# Patient Record
Sex: MALE | Race: WHITE | NOT HISPANIC OR LATINO
[De-identification: names, ages, dates, MRNs, and addresses within clinical notes are randomized per-mention and may not be internally consistent; named-entity substitution may affect disease eponyms.]

---

## 2019-08-18 ENCOUNTER — FORM ENCOUNTER (OUTPATIENT)
Age: 49
End: 2019-08-18

## 2019-08-25 ENCOUNTER — FORM ENCOUNTER (OUTPATIENT)
Age: 49
End: 2019-08-25

## 2019-10-02 ENCOUNTER — FORM ENCOUNTER (OUTPATIENT)
Age: 49
End: 2019-10-02

## 2019-10-03 ENCOUNTER — FORM ENCOUNTER (OUTPATIENT)
Age: 49
End: 2019-10-03

## 2019-11-03 ENCOUNTER — FORM ENCOUNTER (OUTPATIENT)
Age: 49
End: 2019-11-03

## 2019-11-25 ENCOUNTER — FORM ENCOUNTER (OUTPATIENT)
Age: 49
End: 2019-11-25

## 2019-12-02 ENCOUNTER — OUTPATIENT (OUTPATIENT)
Dept: OUTPATIENT SERVICES | Facility: HOSPITAL | Age: 49
LOS: 1 days | Discharge: HOME | End: 2019-12-02
Payer: MEDICARE

## 2019-12-02 VITALS
RESPIRATION RATE: 18 BRPM | WEIGHT: 196.21 LBS | SYSTOLIC BLOOD PRESSURE: 148 MMHG | DIASTOLIC BLOOD PRESSURE: 85 MMHG | TEMPERATURE: 97 F | HEIGHT: 67 IN | OXYGEN SATURATION: 97 % | HEART RATE: 80 BPM

## 2019-12-02 DIAGNOSIS — S83.231D COMPLEX TEAR OF MEDIAL MENISCUS, CURRENT INJURY, RIGHT KNEE, SUBSEQUENT ENCOUNTER: ICD-10-CM

## 2019-12-02 DIAGNOSIS — Z98.890 OTHER SPECIFIED POSTPROCEDURAL STATES: Chronic | ICD-10-CM

## 2019-12-02 DIAGNOSIS — Z01.818 ENCOUNTER FOR OTHER PREPROCEDURAL EXAMINATION: ICD-10-CM

## 2019-12-02 LAB
ALBUMIN SERPL ELPH-MCNC: 5.1 G/DL — SIGNIFICANT CHANGE UP (ref 3.5–5.2)
ALP SERPL-CCNC: 82 U/L — SIGNIFICANT CHANGE UP (ref 30–115)
ALT FLD-CCNC: 24 U/L — SIGNIFICANT CHANGE UP (ref 0–41)
ANION GAP SERPL CALC-SCNC: 15 MMOL/L — HIGH (ref 7–14)
APTT BLD: 30.8 SEC — SIGNIFICANT CHANGE UP (ref 27–39.2)
AST SERPL-CCNC: 20 U/L — SIGNIFICANT CHANGE UP (ref 0–41)
BASOPHILS # BLD AUTO: 0.03 K/UL — SIGNIFICANT CHANGE UP (ref 0–0.2)
BASOPHILS NFR BLD AUTO: 0.5 % — SIGNIFICANT CHANGE UP (ref 0–1)
BILIRUB SERPL-MCNC: 0.2 MG/DL — SIGNIFICANT CHANGE UP (ref 0.2–1.2)
BUN SERPL-MCNC: 10 MG/DL — SIGNIFICANT CHANGE UP (ref 10–20)
CALCIUM SERPL-MCNC: 10.2 MG/DL — HIGH (ref 8.5–10.1)
CHLORIDE SERPL-SCNC: 99 MMOL/L — SIGNIFICANT CHANGE UP (ref 98–110)
CO2 SERPL-SCNC: 26 MMOL/L — SIGNIFICANT CHANGE UP (ref 17–32)
CREAT SERPL-MCNC: 0.9 MG/DL — SIGNIFICANT CHANGE UP (ref 0.7–1.5)
EOSINOPHIL # BLD AUTO: 0.02 K/UL — SIGNIFICANT CHANGE UP (ref 0–0.7)
EOSINOPHIL NFR BLD AUTO: 0.3 % — SIGNIFICANT CHANGE UP (ref 0–8)
GLUCOSE SERPL-MCNC: 94 MG/DL — SIGNIFICANT CHANGE UP (ref 70–99)
HCT VFR BLD CALC: 45.7 % — SIGNIFICANT CHANGE UP (ref 42–52)
HGB BLD-MCNC: 14.7 G/DL — SIGNIFICANT CHANGE UP (ref 14–18)
IMM GRANULOCYTES NFR BLD AUTO: 0.2 % — SIGNIFICANT CHANGE UP (ref 0.1–0.3)
INR BLD: 1.02 RATIO — SIGNIFICANT CHANGE UP (ref 0.65–1.3)
LYMPHOCYTES # BLD AUTO: 1.65 K/UL — SIGNIFICANT CHANGE UP (ref 1.2–3.4)
LYMPHOCYTES # BLD AUTO: 25.4 % — SIGNIFICANT CHANGE UP (ref 20.5–51.1)
MCHC RBC-ENTMCNC: 28.3 PG — SIGNIFICANT CHANGE UP (ref 27–31)
MCHC RBC-ENTMCNC: 32.2 G/DL — SIGNIFICANT CHANGE UP (ref 32–37)
MCV RBC AUTO: 87.9 FL — SIGNIFICANT CHANGE UP (ref 80–94)
MONOCYTES # BLD AUTO: 0.6 K/UL — SIGNIFICANT CHANGE UP (ref 0.1–0.6)
MONOCYTES NFR BLD AUTO: 9.2 % — SIGNIFICANT CHANGE UP (ref 1.7–9.3)
NEUTROPHILS # BLD AUTO: 4.18 K/UL — SIGNIFICANT CHANGE UP (ref 1.4–6.5)
NEUTROPHILS NFR BLD AUTO: 64.4 % — SIGNIFICANT CHANGE UP (ref 42.2–75.2)
NRBC # BLD: 0 /100 WBCS — SIGNIFICANT CHANGE UP (ref 0–0)
PLATELET # BLD AUTO: 339 K/UL — SIGNIFICANT CHANGE UP (ref 130–400)
POTASSIUM SERPL-MCNC: 4.6 MMOL/L — SIGNIFICANT CHANGE UP (ref 3.5–5)
POTASSIUM SERPL-SCNC: 4.6 MMOL/L — SIGNIFICANT CHANGE UP (ref 3.5–5)
PROT SERPL-MCNC: 7.5 G/DL — SIGNIFICANT CHANGE UP (ref 6–8)
PROTHROM AB SERPL-ACNC: 11.7 SEC — SIGNIFICANT CHANGE UP (ref 9.95–12.87)
RBC # BLD: 5.2 M/UL — SIGNIFICANT CHANGE UP (ref 4.7–6.1)
RBC # FLD: 12.2 % — SIGNIFICANT CHANGE UP (ref 11.5–14.5)
SODIUM SERPL-SCNC: 140 MMOL/L — SIGNIFICANT CHANGE UP (ref 135–146)
WBC # BLD: 6.49 K/UL — SIGNIFICANT CHANGE UP (ref 4.8–10.8)
WBC # FLD AUTO: 6.49 K/UL — SIGNIFICANT CHANGE UP (ref 4.8–10.8)

## 2019-12-02 PROCEDURE — 93010 ELECTROCARDIOGRAM REPORT: CPT

## 2019-12-02 NOTE — H&P PST ADULT - NSICDXPASTSURGICALHX_GEN_ALL_CORE_FT
PAST SURGICAL HISTORY:  H/O coronary angiogram     H/O sinus surgery     History of hand surgery Right hand CTR

## 2019-12-02 NOTE — H&P PST ADULT - REASON FOR ADMISSION
50 yo male presents to PAST for surgical arthroscopy under GA on 12/13/2019 at [facility] OR by DR. Hair.

## 2019-12-02 NOTE — H&P PST ADULT - MUSCULOSKELETAL
No joint pain, swelling or deformity; no limitation of movement detailed exam decreased ROM due to pain/B/L knee pains

## 2019-12-02 NOTE — H&P PST ADULT - NSICDXPASTMEDICALHX_GEN_ALL_CORE_FT
PAST MEDICAL HISTORY:  GERD (gastroesophageal reflux disease)     HTN (hypertension)     HTN (hypertension)     Myocardial infarct X 2

## 2019-12-02 NOTE — H&P PST ADULT - HISTORY OF PRESENT ILLNESS
Patient presents with c/o progressively worsening bilateral knee pains x 10 yrs right>left. Patient denies any c/om cp, sob, palpitations fever, cough or dysuria. Ex tolerance of 1 fos walk with out SOB except bilateral knee pains. Positive for VINNIE. Instructions given to bring CPAP machine on the DOS. Patient presents with c/o progressively worsening bilateral knee pains x 10 yrs right>left. Patient denies any c/o cp, sob, palpitations fever, cough or dysuria. Ex tolerance of 1 fos walk with out SOB except bilateral knee pains. Positive for VINNIE. Instructions given to bring CPAP machine on the DOS.

## 2019-12-02 NOTE — H&P PST ADULT - NSANTHOSAYNRD_GEN_A_CORE
No. VINNIE screening performed.  STOP BANG Legend: 0-2 = LOW Risk; 3-4 = INTERMEDIATE Risk; 5-8 = HIGH Risk

## 2019-12-05 PROBLEM — I21.9 ACUTE MYOCARDIAL INFARCTION, UNSPECIFIED: Chronic | Status: ACTIVE | Noted: 2019-12-02

## 2019-12-05 PROBLEM — I10 ESSENTIAL (PRIMARY) HYPERTENSION: Chronic | Status: ACTIVE | Noted: 2019-12-02

## 2019-12-05 PROBLEM — K21.9 GASTRO-ESOPHAGEAL REFLUX DISEASE WITHOUT ESOPHAGITIS: Chronic | Status: ACTIVE | Noted: 2019-12-02

## 2019-12-13 ENCOUNTER — OUTPATIENT (OUTPATIENT)
Dept: OUTPATIENT SERVICES | Facility: HOSPITAL | Age: 49
LOS: 1 days | Discharge: HOME | End: 2019-12-13
Payer: MEDICARE

## 2019-12-13 ENCOUNTER — RESULT REVIEW (OUTPATIENT)
Age: 49
End: 2019-12-13

## 2019-12-13 VITALS
HEART RATE: 62 BPM | SYSTOLIC BLOOD PRESSURE: 113 MMHG | OXYGEN SATURATION: 97 % | RESPIRATION RATE: 20 BRPM | DIASTOLIC BLOOD PRESSURE: 76 MMHG

## 2019-12-13 VITALS
RESPIRATION RATE: 18 BRPM | OXYGEN SATURATION: 98 % | SYSTOLIC BLOOD PRESSURE: 115 MMHG | TEMPERATURE: 98 F | HEIGHT: 67 IN | WEIGHT: 196.21 LBS | DIASTOLIC BLOOD PRESSURE: 70 MMHG | HEART RATE: 72 BPM

## 2019-12-13 DIAGNOSIS — Z98.890 OTHER SPECIFIED POSTPROCEDURAL STATES: Chronic | ICD-10-CM

## 2019-12-13 PROCEDURE — 88304 TISSUE EXAM BY PATHOLOGIST: CPT | Mod: 26

## 2019-12-13 RX ORDER — ONDANSETRON 8 MG/1
4 TABLET, FILM COATED ORAL ONCE
Refills: 0 | Status: DISCONTINUED | OUTPATIENT
Start: 2019-12-13 | End: 2019-12-28

## 2019-12-13 RX ORDER — HYDROMORPHONE HYDROCHLORIDE 2 MG/ML
0.5 INJECTION INTRAMUSCULAR; INTRAVENOUS; SUBCUTANEOUS
Refills: 0 | Status: DISCONTINUED | OUTPATIENT
Start: 2019-12-13 | End: 2019-12-13

## 2019-12-13 RX ORDER — ASPIRIN/CALCIUM CARB/MAGNESIUM 324 MG
1 TABLET ORAL
Qty: 0 | Refills: 0 | DISCHARGE

## 2019-12-13 RX ORDER — LANSOPRAZOLE 15 MG/1
1 CAPSULE, DELAYED RELEASE ORAL
Qty: 0 | Refills: 0 | DISCHARGE

## 2019-12-13 RX ORDER — TIZANIDINE 4 MG/1
0 TABLET ORAL
Qty: 0 | Refills: 0 | DISCHARGE

## 2019-12-13 RX ORDER — SODIUM CHLORIDE 9 MG/ML
1000 INJECTION, SOLUTION INTRAVENOUS
Refills: 0 | Status: DISCONTINUED | OUTPATIENT
Start: 2019-12-13 | End: 2019-12-28

## 2019-12-13 RX ORDER — LISDEXAMFETAMINE DIMESYLATE 70 MG/1
1 CAPSULE ORAL
Qty: 0 | Refills: 0 | DISCHARGE

## 2019-12-13 RX ORDER — DILTIAZEM HCL 120 MG
1 CAPSULE, EXT RELEASE 24 HR ORAL
Qty: 0 | Refills: 0 | DISCHARGE

## 2019-12-13 RX ORDER — OXYCODONE AND ACETAMINOPHEN 5; 325 MG/1; MG/1
1 TABLET ORAL EVERY 4 HOURS
Refills: 0 | Status: DISCONTINUED | OUTPATIENT
Start: 2019-12-13 | End: 2019-12-13

## 2019-12-13 RX ORDER — EVOLOCUMAB 140 MG/ML
1 INJECTION, SOLUTION SUBCUTANEOUS
Qty: 0 | Refills: 0 | DISCHARGE

## 2019-12-13 RX ADMIN — SODIUM CHLORIDE 100 MILLILITER(S): 9 INJECTION, SOLUTION INTRAVENOUS at 13:01

## 2019-12-13 NOTE — ASU PATIENT PROFILE, ADULT - PMH
GERD (gastroesophageal reflux disease)    HTN (hypertension)    HTN (hypertension)    Myocardial infarct  X 2

## 2019-12-13 NOTE — ASU DISCHARGE PLAN (ADULT/PEDIATRIC) - CARE PROVIDER_API CALL
Filemon Hair (MD)  Orthopaedic Surgery  [address] [place] [address]  Phone: [phone]  Fax: [phone]  Follow Up Time:

## 2019-12-13 NOTE — BRIEF OPERATIVE NOTE - NSICDXBRIEFPROCEDURE_GEN_ALL_CORE_FT
PROCEDURES:  Arthroscopy of left knee with surgical removal of medial meniscus 13-Dec-2019 13:07:24  Filemon Hair

## 2019-12-17 LAB — SURGICAL PATHOLOGY STUDY: SIGNIFICANT CHANGE UP

## 2019-12-19 DIAGNOSIS — I10 ESSENTIAL (PRIMARY) HYPERTENSION: ICD-10-CM

## 2019-12-19 DIAGNOSIS — Z79.82 LONG TERM (CURRENT) USE OF ASPIRIN: ICD-10-CM

## 2019-12-19 DIAGNOSIS — M23.232 DERANGEMENT OF OTHER MEDIAL MENISCUS DUE TO OLD TEAR OR INJURY, LEFT KNEE: ICD-10-CM

## 2019-12-19 DIAGNOSIS — I21.9 ACUTE MYOCARDIAL INFARCTION, UNSPECIFIED: ICD-10-CM

## 2020-01-08 ENCOUNTER — FORM ENCOUNTER (OUTPATIENT)
Age: 50
End: 2020-01-08

## 2020-01-13 ENCOUNTER — FORM ENCOUNTER (OUTPATIENT)
Age: 50
End: 2020-01-13

## 2020-03-10 ENCOUNTER — FORM ENCOUNTER (OUTPATIENT)
Age: 50
End: 2020-03-10

## 2020-05-07 ENCOUNTER — FORM ENCOUNTER (OUTPATIENT)
Age: 50
End: 2020-05-07

## 2020-05-11 ENCOUNTER — FORM ENCOUNTER (OUTPATIENT)
Age: 50
End: 2020-05-11

## 2020-05-18 ENCOUNTER — FORM ENCOUNTER (OUTPATIENT)
Age: 50
End: 2020-05-18

## 2020-07-26 ENCOUNTER — FORM ENCOUNTER (OUTPATIENT)
Age: 50
End: 2020-07-26

## 2020-08-25 ENCOUNTER — FORM ENCOUNTER (OUTPATIENT)
Age: 50
End: 2020-08-25

## 2020-09-07 ENCOUNTER — FORM ENCOUNTER (OUTPATIENT)
Age: 50
End: 2020-09-07

## 2020-10-05 ENCOUNTER — FORM ENCOUNTER (OUTPATIENT)
Age: 50
End: 2020-10-05

## 2020-10-14 PROBLEM — Z00.00 ENCOUNTER FOR PREVENTIVE HEALTH EXAMINATION: Status: ACTIVE | Noted: 2020-10-14

## 2020-10-15 ENCOUNTER — APPOINTMENT (OUTPATIENT)
Dept: ORTHOPEDIC SURGERY | Facility: CLINIC | Age: 50
End: 2020-10-15
Payer: MEDICARE

## 2020-10-15 VITALS
WEIGHT: 193 LBS | HEIGHT: 67 IN | BODY MASS INDEX: 30.29 KG/M2 | DIASTOLIC BLOOD PRESSURE: 90 MMHG | SYSTOLIC BLOOD PRESSURE: 131 MMHG | HEART RATE: 79 BPM

## 2020-10-15 DIAGNOSIS — Z86.39 PERSONAL HISTORY OF OTHER ENDOCRINE, NUTRITIONAL AND METABOLIC DISEASE: ICD-10-CM

## 2020-10-15 DIAGNOSIS — I25.2 OLD MYOCARDIAL INFARCTION: ICD-10-CM

## 2020-10-15 DIAGNOSIS — Z72.89 OTHER PROBLEMS RELATED TO LIFESTYLE: ICD-10-CM

## 2020-10-15 DIAGNOSIS — Z86.79 PERSONAL HISTORY OF OTHER DISEASES OF THE CIRCULATORY SYSTEM: ICD-10-CM

## 2020-10-15 PROCEDURE — 99203 OFFICE O/P NEW LOW 30 MIN: CPT | Mod: 25

## 2020-10-15 PROCEDURE — 73560 X-RAY EXAM OF KNEE 1 OR 2: CPT | Mod: RT

## 2020-10-15 PROCEDURE — 20610 DRAIN/INJ JOINT/BURSA W/O US: CPT | Mod: LT

## 2020-10-15 PROCEDURE — 73564 X-RAY EXAM KNEE 4 OR MORE: CPT | Mod: LT

## 2020-10-15 RX ORDER — ASPIRIN 81 MG
81 TABLET, DELAYED RELEASE (ENTERIC COATED) ORAL
Refills: 0 | Status: ACTIVE | COMMUNITY

## 2020-10-15 NOTE — HISTORY OF PRESENT ILLNESS
[de-identified] : Mr. Duvall is a 50-year-old Retired  on disability who comes in with LEFT > RIGHT knee pain.\par he had some LEFT knee issues for several years.In December 2019 he went for a LEFT knee arthroscopy and probable partial medial meniscectomy. He will try to get me a copy of the operative report. His knee did improve after the surgery and was doing okay until about 2 months ago. At that time he was kneeling to fix the sink wearing knee pads and when he got up he had severe LEFT knee pain. He was in [place] and saw a doctor and had fluid aspirated and Cortizone injection in both knees initially. A week later he had another injection of steroids and then on September 28 went for Durolane injection. He didn't notice any improvement in his LEFT knee with ongoing pain and swelling. The RIGHT knee did get better after the hyaluronic acid injection.\par He has constant pain in his LEFT knee 8/10, sharp and stabbing at times and also feels tight. It is better with rest and ice it and taking medication. He has meloxicam and has taken some ibuprofen. recently he was feeling somewhat nauseous so wasn't sure if he should not take it.\par Pain is worse walking and bending his knee.\par He saw another doctor recently\par He did physical therapy in January of this year and he seen a chiropractor.\par He has stiffness and swelling in his knee.\par

## 2020-10-15 NOTE — ASSESSMENT
[FreeTextEntry1] : 50-year-old Retired  presents with ongoing LEFT knee pain and swelling in the last couple months about 10 months following a partial medial meniscectomy. There is some underlying osteoarthritis in the medial and patellofemoral compartments and a full-thickness chondral lesion in the weightbearing surface of the medial femoral condyle measuring about 10 x 20 mm and extensive bone marrow edema in the medial femoral condyle. There is a subtotal medial meniscectomy and he has mild varus malalignment.\par Given some of the more diffuse degenerative changes and his age being almost 51 he may likely not be a great candidate for a cartilage preservation type of procedure such as an osteochondral allograft with her probable osteotomy of the tibia to correct the varus alignment and consideration for a meniscal transplant.\par We will try a more nonoperative treatment first. I suggested getting a medial  brace and using crutches temporarily. He should do home exercises to strengthen around the knees all of which should be nonweightbearing and I reviewed with him. He should ice his knee and can take intermittent ibuprofen or meloxicam. I would see him back in 3-4 weeks to see if he's improving or not.\par Ultimately this wearing tear in the medial compartment is going to progress towards worsening osteoarthritis and eventually he may need a knee replacement.\par He should get the operative report from his prior surgery.\par

## 2020-10-15 NOTE — PROCEDURE
[Left] : of the left [Aspiration] : Aspiration [Effusion] : Effusion [Knee Joint] : knee joint [Patient] : patient [Risk] : risk [Benefits] : benefits [Bleeding] : bleeding [Alternatives] : alternatives [Infection] : infection [Allergic Reaction] : allergic reaction [Verbal Consent Obtained] : verbal consent was obtained prior to the procedure [Alcohol] : Alcohol [Betadine] : Betadine [Ethyl Chloride Spray] : ethyl chloride spray was used as a topical anesthetic [Lateral] : lateral [___ mL Fluid] : [unfilled] mL of [20] : a 20-gauge [Clear] : clear [Yellow] : yellow [Same Needle/New Syringe] : the syringe was changed and the same needle was left in place and [Bandage Applied] : a bandage [Tolerated Well] : The patient tolerated the procedure well [None] : none [No Strenuous Activity___day(s)] : avoid strenuous activity for [unfilled] day(s) [___ Week(s)] : in [unfilled] week(s)

## 2020-10-15 NOTE — PHYSICAL EXAM
[LE] : Sensory: Intact in bilateral lower extremities [DP] : dorsalis pedis 2+ and symmetric bilaterally [PT] : posterior tibial 2+ and symmetric bilaterally [Normal RLE] : Right Lower Extremity: No scars, rashes, lesions, ulcers, skin intact [Normal LLE] : Left Lower Extremity: No scars, rashes, lesions, ulcers, skin intact [Normal Touch] : sensation intact for touch [Normal] : Oriented to person, place, and time, insight and judgement were intact and the affect was normal [de-identified] : Knees:\par Antalgic gait favoring his LEFT knee.\par He cannot quite fully squat due to pain and tightness in the LEFT knee\par 2+ effusion LEFT knee and no effusion RIGHT knee.\par - erythema, edema, warmth.\par Tender LEFT knee medial joint line and medial femoral condyle and mildly patellar facets. Tender RIGHT knee mildly on the patellar facets.\par ROM: 0 degrees extension to 125-130° LEFT knee versus RIGHT knee 135 degrees flexion. Minimal crepitus\par Mild pain LEFT knee with Fay.\par 1A Lachman.  - Pivot shift. - posterior drawer. Normal rotational, varus/valgus laxity.\par Intact extensor mechanism.\par NVI distally.\par Mild Varus alignment LEFT greater than RIGHT knee\par  [de-identified] : No respiratory distress or cough [de-identified] : \par MRI of the LEFT knee in August 2016 showed an oblique tear in the medial meniscus and a grade 1 sprain of the MCL and ACL and low-grade tricompartmental chondral wear and 5 mm high-grade focus of cartilage loss in the trochlea and a small joint effusion and baker cyst.\par \par MRI October 1, 2020 LEFT knee shows Attenuation and possible re-tear of the medial meniscus and chondromalacia with a possible chondral flap medial femoral condyle with bone marrow edema in the medial femoral condyle and a large joint effusion\par \par X-rays of the LEFT knee today weightbearing were done since his last set of x-rays were nonweightbearing. On the standing AP view there is narrowing of the medial joint space by about 50 percent compared to his RIGHT knee joint space medially.No increased narrowing on the flexed view. There are degenerative changes mild to moderate at the patellofemoral joint with mild narrowing and osteophytes.

## 2020-11-04 ENCOUNTER — APPOINTMENT (OUTPATIENT)
Dept: ORTHOPEDIC SURGERY | Facility: CLINIC | Age: 50
End: 2020-11-04
Payer: MEDICARE

## 2020-11-04 DIAGNOSIS — M25.462 EFFUSION, LEFT KNEE: ICD-10-CM

## 2020-11-04 PROCEDURE — 99214 OFFICE O/P EST MOD 30 MIN: CPT

## 2020-11-04 NOTE — HISTORY OF PRESENT ILLNESS
[de-identified] : Mr. Duvall comes in for f/u LEFT knee pain.\par He used crutches and knee brace. He has some young boys at home and it didn't use the brace all the time and was on it a lot during the day. It's difficult for him to be an active although he thinks he'll be able to do a better job now.\par He still has pain and swelling in the knee which doesn't feel much better than last visit.\par He has taken no meloxicam.\par \par Pain is worse walking and bending his knee.\par \par He did physical therapy in January of this year and he seen a chiropractor.\par He has stiffness and swelling in his knee.\par

## 2020-11-04 NOTE — PHYSICAL EXAM
[Slightly Antalgic] : slightly antalgic [de-identified] : Knees:\par Antalgic gait favoring his LEFT knee.\par 2+ effusion LEFT knee and no effusion RIGHT knee.\par - erythema, edema, warmth.\par Tender LEFT knee anterior medial joint line and medial femoral condyle and mildly patellar facets. Tender RIGHT knee mildly on the patellar facets.\par ROM: 0 degrees extension to 125-130° LEFT knee versus RIGHT knee 135 degrees flexion. Minimal crepitus\par Mild pain LEFT knee with Fay.\par 1A Lachman.  - Pivot shift. - posterior drawer. Normal rotational, varus/valgus laxity.\par Intact extensor mechanism.\par NVI distally.\par Mild Varus alignment LEFT greater than RIGHT knee\par  [de-identified] : No respiratory distress or cough [de-identified] : \par MRI of the LEFT knee in August 2016 showed an oblique tear in the medial meniscus and a grade 1 sprain of the MCL and ACL and low-grade tricompartmental chondral wear and 5 mm high-grade focus of cartilage loss in the trochlea and a small joint effusion and baker cyst.\par \par MRI October 1, 2020 LEFT knee shows Attenuation and possible re-tear of the medial meniscus and chondromalacia with a possible chondral flap medial femoral condyle with bone marrow edema in the medial femoral condyle and a large joint effusion. there is an area of complete chondral loss medial femoral condyle that name measure about 1 x 2 cm \par \par X-rays of the LEFT knee last visit weightbearing  standing AP view there is narrowing of the medial joint space by about 50 percent compared to his RIGHT knee joint space medially.No increased narrowing on the flexed view. There are degenerative changes mild to moderate at the patellofemoral joint with mild narrowing and osteophytes.

## 2020-11-04 NOTE — ASSESSMENT
[FreeTextEntry1] : 50-year-old retired  presents with ongoing LEFT knee pain and swelling in the last several months about 11 months following a partial medial meniscectomy. There is some underlying osteoarthritis in the medial and patellofemoral compartments and a full-thickness chondral lesion in the weightbearing surface of the medial femoral condyle measuring about 10 x 20 mm and extensive bone marrow edema in the medial femoral condyle. There is a subtotal medial meniscectomy and he has mild varus malalignment.\par Unfortunately using the crutches and braces have not helped although I don't think he is done it really consistently. Think he was wearing the brace a little distal end we worked on pulling it more proximally to center it on the knee joint which hopefully may help. With the brace on and crutches walking light partial weightbearing he had no pain. He'll give it another 3 weeks of protected weightbearing and NWB exercises to see if it improves.  Ice and NSAIDs as needed.\par he has tried hyaluronic acid injections. He could consider PRP or stem cell injections although It is unlikely to really restore any cartilage but may provide some pain relief and decrease inflammation.\par I discussed treatment options. To try to restore the cartilage one may consider tibial osteotomy, a meniscal transplant and osteochondral allograft. Otherwise one may consider a partial unicompartmental knee replacement. If he got good enough pain relief he could wait and do a partial or total knee replacement at some point in the future when needed.\par He is going to see one of my colleagues in 3 weeks to get his opinion.\par He can followup as needed after that.

## 2020-11-19 ENCOUNTER — APPOINTMENT (OUTPATIENT)
Dept: ORTHOPEDIC SURGERY | Facility: CLINIC | Age: 50
End: 2020-11-19
Payer: MEDICARE

## 2020-11-19 VITALS
SYSTOLIC BLOOD PRESSURE: 134 MMHG | HEART RATE: 82 BPM | HEIGHT: 67 IN | WEIGHT: 193 LBS | DIASTOLIC BLOOD PRESSURE: 88 MMHG | BODY MASS INDEX: 30.29 KG/M2 | OXYGEN SATURATION: 98 %

## 2020-11-19 DIAGNOSIS — M25.561 PAIN IN RIGHT KNEE: ICD-10-CM

## 2020-11-19 DIAGNOSIS — G89.29 PAIN IN RIGHT KNEE: ICD-10-CM

## 2020-11-19 DIAGNOSIS — M95.8 OTHER SPECIFIED ACQUIRED DEFORMITIES OF MUSCULOSKELETAL SYSTEM: ICD-10-CM

## 2020-11-19 PROCEDURE — 99214 OFFICE O/P EST MOD 30 MIN: CPT

## 2020-11-19 NOTE — HISTORY OF PRESENT ILLNESS
[Bending] : worsened by bending [Walking] : worsened by walking [Ice] : relieved by ice [Rest] : relieved by rest [de-identified] : ELIZABETH OROSCO is a 50 year  old  patient that presents today with Right knee pain sent for consultation by Dr. Miller. He has  along history of left knee pain. Parents have had TKAs but in their mid to late 70's. \par He had meniscectomy one year ago and since has had recurrent pain and swelling. \par Dr. Miller treated him with combination of Arthrocentesis HA injection and medial unloading brace which has helped significantly. He still gets occasional swelling and medial knee pain and comes to discuss treatment options. \par \par

## 2020-11-19 NOTE — DISCUSSION/SUMMARY
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 Unfortunately x-rays were down today so could not get a scanogram buit have sent him for one at [facility]. \par He feels a mechanical catch which is related to the hanging chondral flap at this time however his pain has been significantly mitigated with  brace and HA injection. \par He and I spent a long time discussing non operative and operative options for this knee in a 50 year old patient. \par For now we will let him consider options while we see if the knee pain continues to improve with the unloading brace and added pHysical therapy for Quad core and gluteal PRE plus cyclic loading. While doing this we will get full length scanogram. \par Future options would include a staging arthroscopy to evaluate meniscus and AC and harvest cells for future ZAC transplant if appropriate. The scanogram would let us know if osteotomy would be needed as well. LAstly the chondral flap debridement would hopefully get rid of the mechanical catch he is getting. \par For ultimate treatment at this age the choices would be Biologic reconstruction with cell transplant or OsteoChondral donor allograft transplant along with osteotomy if alignment is varus more than 5 degrees.\par The other option would be a unicondylar knee arthroplasty of the medial side which actually would offer quicker recovery if at 50 he is willing to accept metal and plastic. \par If arthroscopically we see that  the chondral defect is only partial then in early 2021 once approved by FDA we could consider the NU Surface meniscal implant as a quicker recovery option. \par \par All of these options with their attendant post op course were discussed and we will reevaluate him in one month after PT completed to see his level of pain and discuss again for a final decision.

## 2020-11-19 NOTE — REVIEW OF SYSTEMS
[Joint Pain] : joint pain [Joint Stiffness] : joint stiffness [Joint Swelling] : joint swelling [Headache] : headache [Negative] : Heme/Lymph

## 2020-11-19 NOTE — PHYSICAL EXAM
[de-identified] : Patient is well nourished, well-developed, in no acute distress, with appropriate mood and affect. The patient is oriented to time, place, and person. Gait evaluation notes ambulation with cane.  The skin examination does not reveal obvious lesions, lacerations, or ecchymosis.\par Knee exam: ROM 0-135 degrees: Negative Lachman and Fay Positive medial joint tenderness and lateral patella tenderness. \par Grade 1/2 effusion today. \par \par  [de-identified] : Previous x-rays show patella malalignment and minimal medial joint narrowing KL2 only but MRI done in October shows chondral defect MFC with partial posterior horn menisectomy /lateral joint structures normal.

## 2020-11-23 ENCOUNTER — OUTPATIENT (OUTPATIENT)
Dept: OUTPATIENT SERVICES | Facility: HOSPITAL | Age: 50
LOS: 1 days | Discharge: HOME | End: 2020-11-23
Payer: MEDICARE

## 2020-11-23 DIAGNOSIS — M79.606 PAIN IN LEG, UNSPECIFIED: ICD-10-CM

## 2020-11-23 DIAGNOSIS — Z98.890 OTHER SPECIFIED POSTPROCEDURAL STATES: Chronic | ICD-10-CM

## 2020-11-23 PROCEDURE — 77073 BONE LENGTH STUDIES: CPT | Mod: 26

## 2020-12-09 ENCOUNTER — APPOINTMENT (OUTPATIENT)
Dept: ORTHOPEDIC SURGERY | Facility: CLINIC | Age: 50
End: 2020-12-09
Payer: MEDICARE

## 2020-12-09 ENCOUNTER — APPOINTMENT (OUTPATIENT)
Dept: ORTHOPEDIC SURGERY | Facility: CLINIC | Age: 50
End: 2020-12-09

## 2020-12-09 VITALS
SYSTOLIC BLOOD PRESSURE: 124 MMHG | BODY MASS INDEX: 30.61 KG/M2 | HEIGHT: 67 IN | HEART RATE: 59 BPM | DIASTOLIC BLOOD PRESSURE: 83 MMHG | WEIGHT: 195 LBS

## 2020-12-09 DIAGNOSIS — M25.562 PAIN IN LEFT KNEE: ICD-10-CM

## 2020-12-09 DIAGNOSIS — M17.5 OTHER UNILATERAL SECONDARY OSTEOARTHRITIS OF KNEE: ICD-10-CM

## 2020-12-09 DIAGNOSIS — M17.12 UNILATERAL PRIMARY OSTEOARTHRITIS, LEFT KNEE: ICD-10-CM

## 2020-12-09 DIAGNOSIS — Z82.61 FAMILY HISTORY OF ARTHRITIS: ICD-10-CM

## 2020-12-09 DIAGNOSIS — Z87.39 PERSONAL HISTORY OF OTHER DISEASES OF THE MUSCULOSKELETAL SYSTEM AND CONNECTIVE TISSUE: ICD-10-CM

## 2020-12-09 PROCEDURE — 99214 OFFICE O/P EST MOD 30 MIN: CPT

## 2020-12-09 NOTE — PHYSICAL EXAM
[de-identified] : General appearance: well nourished and hydrated, pleasant, alert and oriented x 3, cooperative.  \par HEENT: normocephalic, EOM intact, wearing mask, external auditory canal clear.  \par Cardiovascular: no lower leg edema, no varicosities, dorsalis pedis pulses palpable and symmetric.  \par Lymphatics: no palpable lymphadenopathy, no lymphedema.  \par Neurologic: sensation is normal, no muscle weakness in upper or lower extremities, patella tendon reflexes present and symmetric.  \par Dermatologic: skin moist, warm, no rash.  \par Spine: cervical spine with normal lordosis and painless range of motion, thoracic spine with normal kyphosis and painless range of motion, lumbosacral spine with normal lordosis and stiff range of motion.  Tenderness to palpation along midline lumbar spine and paraspinal musculature.  Sacroiliac joints tender bilaterally, more so on the left. Negative SLR and crossed SLR tests bilaterally.\par Gait: mild left antalgia with reduced stride length and sukhjinder; no varus thrust.\par \par Left knee:\par - Inspection: moderate effusion, negative ecchymosis and erythema.  \par - Wounds: healed arthroscopic portals.\par - Alignment: normal.  \par - Palpation: medial and lateral joint line, peripatellar tenderness on palpation.  \par - ROM active: 0-140, pain on extremes of motion\par - Ligamentous laxity: negative Lachman, negative ant. drawer test, negative post. drawer test, negative pivot shift test, stable to varus stress, stable to valgus stress.  \par - Meniscal Test: painful Fay and Carlie.  \par - Popliteal angle: 60 degrees\par - Muscle Test: 5/5 quad strength.  \par \par Right knee:\par - Inspection: negative swelling, ecchymosis, and erythema.  \par - Wounds: none.  \par - Alignment: normal.  \par - Palpation: medial and lateral joint line, peripatellar tenderness on palpation.  \par - ROM active: 0-140, pain on extremes of motion\par - Ligamentous laxity: negative Lachman, negative ant. drawer test, negative post. drawer test, negative pivot shift test, stable to varus stress, stable to valgus stress.  \par - Meniscal Test: painful Fay and Carlie.  \par - Popliteal angle: 60 degrees\par - Muscle Test: 5/5 quad strength.   [de-identified] : XRs were reviewed from [facility] and MRI was reviewed from disc from [facility]; those images were not able to be uploaded to any of our platforms.\par \par XRs demonstrate moderate left knee medial compartment osteoarthritis with approximately 50% joint space narrowing, Kellgren-Blaze 2. Lateral and patellofemoral compartments appear well preserved. Resultant mild varus malalignment on long leg standing films, ~9 degrees total limb varus. No translational deformity. Patella sits at appropriate height and tracks centrally.\par \par MRI demonstrates multiple focal full thickness chondral defects in the medial femoral condyle with diffuse bony edema medially. Evidence of prior medial meniscectomy with new radial tear at the posterior horn. Lateral compartment without significant chondral disease or lateral meniscal pathology. Lateral trochlea with chondrosis; patellar cartilage in good condition.

## 2020-12-09 NOTE — HISTORY OF PRESENT ILLNESS
[de-identified] : Here for follow up after scanogram and MRI evaluation to discuss surgical options.

## 2020-12-09 NOTE — HISTORY OF PRESENT ILLNESS
[de-identified] : 51y/o male referred by Dr. Baker for evaluation of left knee osteoarthritis. Patient has a history of chronic pain affecting the bilateral lower extremities and lumbar spine for approximately 15-20 years, most severely affecting the left knee. He attributes this to strenuous sporting activities in his youth and a physical job at the [employer]. He is currently retired, both for cardiac issues and chronic MSK pain. He has been through exhaustive treatments for the left knee including multiple courses of PT, CSI and HA injections, oral analgesics, and arthroscopic surgery last December. He currently uses a cane and a medial offloader brace with some relief. With these, he is able to ambulate approximately ten blocks before the knee stops him. Pain is mostly at the medial aspect of the knee, but he also reports lateral joint line pain and sharp anterior pain with proximal and distal radiation. He has similar symptoms on the right, though less severe. He has significant midline low back pain and active left sided sciatica that he mostly feels in the posterolateral left thigh. He has 9 and 14y/o children and feels regretful that he can't play football with them. He is planning to move to [place] within a month or two. He is here to discuss partial vs. total knee arthroplasty.\par \par He has a history of Prinzmetal angina with 2x MI in 2013 and 2016. He has not undergone any cardiac stenting or open heart surgery, though he reports that his cardiologist has suggested that he may require an open heart procedure at some point in his 50s. [Stable] : stable [8] : a current pain level of 8/10 [Daily] : ~He/She~ states the symptoms seem to be occuring daily [Bending] : worsened by bending [Walking] : worsened by walking [Heat] : relieved by heat [Ice] : relieved by ice [NSAIDs] : relieved by nonsteroidal anti-inflammatory drugs [de-identified] : full/sharp/stabbing/swollen

## 2020-12-09 NOTE — REVIEW OF SYSTEMS
[Arthralgia] : arthralgia [Joint Pain] : joint pain [Joint Stiffness] : joint stiffness [Joint Swelling] : joint swelling [Negative] : Heme/Lymph [FreeTextEntry5] : Hx of printzmetal angina

## 2020-12-09 NOTE — DISCUSSION/SUMMARY
[de-identified] : 49y/o male with left knee medial femoral condylar osteochondral injuries and subchondral osseous stress reaction, medial meniscal tear, moderate medial and patellofemoral osteoarthritis\par - We discussed the details of medial unicondylar as well as total knee arthroplasty. Given his significant pain in all three compartments, I recommended against unicondylar arthroplasty and focused the remainder of the conversation on total knee arthroplasty. We discussed the expected recovery period, and the expected outcome. We discussed the likelihood of satisfaction after complete recovery, and the potential causes of dissatisfaction. The importance of active patient participation in the rehabilitation protocol was emphasized, along with its influence on short and long-term outcomes. Specific risks of total knee replacement were discussed in detail. We discussed the risk of surgical site complications including but not limited to: surgical site infection, wound healing complications, bone fracture, tendon or ligament injury, neurovascular injury, hemorrhage, postoperative stiffness or instability, persistent pain and need for reoperation or manipulation under anesthesia. We discussed surgical blood loss and the possible need for blood transfusion. We discussed the risk of perioperative medical complications, including but not limited to catheter-associated urinary tract infection, venous thromboembolism and other cardiopulmonary complications. We discussed anesthetic options and the risk of anesthesia-related complications. We discussed the relevance of his young age and the increased risk of chronic pain and limited function in the younger arthroplasty patient. We discussed implant fixation methods; my plan would be to use cementless fixation if possible in this case. We discussed the variable need to resurface the patella; my plan would be to resurface in this case. We discussed the durability of prosthetic knees and limitations related to wear, osteolysis and loosening. \par - I ultimately recommended that he not proceed with TKA scheduling given: the relatively mild-moderate radiographic severity of disease; his well preserved range of motion; and his young age. He will continue following up with Dr. Baker to discuss joint preservation procedures. In the meantime, he will continue with therapeutic exercise and Tylenol/NSAIDs/injections as needed for symptomatic control.\par - Follow up with me as needed.

## 2020-12-09 NOTE — DISCUSSION/SUMMARY
[de-identified] : Had long discussion today about two options after noting 7.5 degrees varus of left knee with 1 degree of right. \par If he chose Biologic route for reconstruction he would need HTO plus meniscal and probable ZAC implnats. \par As he is nearing 51 and retired on disability I believe the better option is one surgery : Unicondylar Knee replacement\par His other option to delay this would be an Orthobiologic injection and we have discussed options of Amniotic injection vs Prp to buy time before he moves to [place] and continue  brace and then consider Uni in a year. \par He will see Dr Ian Amaral today and consider Uni and then return if not a candidate. \par \par Addendum: Dr Amaral fully evaluated and believes he only has KL2 DJD and would need not uni but total due to PF discomfort therefore he does not believe he is ready for an arthroplasty. \par Choice would be TWYLA injection in office versus arthroscopic debridement with  retrograde drilling of MFC sub chondral edema with BMAC or prp injected which would necessitate 6 weeks on crutches and staying in [place] at least 6-8 weeks PO before moving to [place]. As he wants to move sooner we will arrange for TWYLA injection and Physical Therapy now and allow him to move first of year with further f/u in [place]. \par \par \par \par

## 2020-12-16 ENCOUNTER — APPOINTMENT (OUTPATIENT)
Dept: ORTHOPEDIC SURGERY | Facility: CLINIC | Age: 50
End: 2020-12-16

## 2020-12-23 ENCOUNTER — APPOINTMENT (OUTPATIENT)
Dept: ORTHOPEDIC SURGERY | Facility: CLINIC | Age: 50
End: 2020-12-23

## 2021-01-12 ENCOUNTER — FORM ENCOUNTER (OUTPATIENT)
Age: 51
End: 2021-01-12

## 2021-04-18 ENCOUNTER — FORM ENCOUNTER (OUTPATIENT)
Age: 51
End: 2021-04-18

## 2021-06-30 ENCOUNTER — FORM ENCOUNTER (OUTPATIENT)
Age: 51
End: 2021-06-30

## 2021-08-12 ENCOUNTER — FORM ENCOUNTER (OUTPATIENT)
Age: 51
End: 2021-08-12

## 2021-10-03 ENCOUNTER — FORM ENCOUNTER (OUTPATIENT)
Age: 51
End: 2021-10-03

## 2021-10-24 ENCOUNTER — FORM ENCOUNTER (OUTPATIENT)
Age: 51
End: 2021-10-24

## 2021-11-07 ENCOUNTER — FORM ENCOUNTER (OUTPATIENT)
Age: 51
End: 2021-11-07

## 2022-03-03 ENCOUNTER — FORM ENCOUNTER (OUTPATIENT)
Age: 52
End: 2022-03-03

## 2022-04-03 ENCOUNTER — FORM ENCOUNTER (OUTPATIENT)
Age: 52
End: 2022-04-03

## 2022-06-19 ENCOUNTER — FORM ENCOUNTER (OUTPATIENT)
Age: 52
End: 2022-06-19

## 2022-06-21 ENCOUNTER — APPOINTMENT (OUTPATIENT)
Dept: ORTHOPEDIC SURGERY | Facility: CLINIC | Age: 52
End: 2022-06-21

## 2022-06-21 VITALS — WEIGHT: 195 LBS | BODY MASS INDEX: 30.61 KG/M2 | HEIGHT: 67 IN

## 2022-06-21 PROCEDURE — 99213 OFFICE O/P EST LOW 20 MIN: CPT | Mod: 25

## 2022-06-21 PROCEDURE — 20610 DRAIN/INJ JOINT/BURSA W/O US: CPT | Mod: LT

## 2022-07-06 NOTE — REASON FOR VISIT
[FreeTextEntry2] : patient has worsening left knee pain\par  left knee was helped by the gel back in November still using sleeve pain is worse with stairs some swelling he has had no further a RFA procedures for his back right knee is stable new complaints some right heel pain getting out of bed in the morning weight is stable  still some pain in both elbows laterally

## 2022-07-06 NOTE — PHYSICAL EXAM
[de-identified] :  no focal neurologic deficits pain in both elbows worse with resisted extension and supination at the wrist pain in the right hand still over the 4th digit  and in the left hand over the index finger spasm in the neck and back with limited motion negative straight leg bilaterally some pain in the right groin worse with rotation right knee has tenderness on the mediolateral side left knee has some crepitus moderate swelling has a brace on the left knee\par  right heel Achilles intact negative Garvin test diffuse tenderness on the calcaneus

## 2022-07-06 NOTE — ASSESSMENT
[FreeTextEntry1] :  discussed option of a left knee gel injection risks benefits reviewed consent given with sterile technique Synvisc was done through a lateral approach Band-Aid applied tolerated well will continue with his pain management follow-up he remains totally disabled unable to work I will see him back in October

## 2022-08-26 ENCOUNTER — NON-APPOINTMENT (OUTPATIENT)
Age: 52
End: 2022-08-26

## 2022-08-26 DIAGNOSIS — I21.4 NON-ST ELEVATION (NSTEMI) MYOCARDIAL INFARCTION: ICD-10-CM

## 2022-08-26 DIAGNOSIS — Q24.8 OTHER SPECIFIED CONGENITAL MALFORMATIONS OF HEART: ICD-10-CM

## 2022-08-26 DIAGNOSIS — Z78.9 OTHER SPECIFIED HEALTH STATUS: ICD-10-CM

## 2022-08-26 DIAGNOSIS — U07.1 COVID-19: ICD-10-CM

## 2022-08-26 DIAGNOSIS — I25.10 ATHEROSCLEROTIC HEART DISEASE OF NATIVE CORONARY ARTERY W/OUT ANGINA PECTORIS: ICD-10-CM

## 2022-08-26 DIAGNOSIS — R06.02 SHORTNESS OF BREATH: ICD-10-CM

## 2022-08-26 DIAGNOSIS — I25.2 OLD MYOCARDIAL INFARCTION: ICD-10-CM

## 2022-08-26 DIAGNOSIS — I25.111 ATHEROSCLEROTIC HEART DISEASE OF NATIVE CORONARY ARTERY WITH ANGINA PECTORIS WITH DOCUMENTED SPASM: ICD-10-CM

## 2022-08-26 RX ORDER — TIZANIDINE 4 MG/1
4 TABLET ORAL
Refills: 0 | Status: ACTIVE | COMMUNITY

## 2022-08-26 RX ORDER — AMLODIPINE BESYLATE 10 MG/1
10 TABLET ORAL DAILY
Refills: 0 | Status: ACTIVE | COMMUNITY

## 2022-10-10 DIAGNOSIS — Q24.5 MALFORMATION OF CORONARY VESSELS: ICD-10-CM

## 2022-10-10 RX ORDER — DILTIAZEM HYDROCHLORIDE 180 MG/1
180 CAPSULE, EXTENDED RELEASE ORAL
Qty: 90 | Refills: 3 | Status: ACTIVE | COMMUNITY
Start: 1900-01-01 | End: 1900-01-01

## 2022-10-18 ENCOUNTER — APPOINTMENT (OUTPATIENT)
Dept: CARDIOLOGY | Facility: CLINIC | Age: 52
End: 2022-10-18

## 2022-10-18 VITALS
OXYGEN SATURATION: 97 % | HEART RATE: 72 BPM | BODY MASS INDEX: 31.71 KG/M2 | SYSTOLIC BLOOD PRESSURE: 122 MMHG | HEIGHT: 67 IN | RESPIRATION RATE: 15 BRPM | DIASTOLIC BLOOD PRESSURE: 80 MMHG | WEIGHT: 202 LBS

## 2022-10-18 DIAGNOSIS — I10 ESSENTIAL (PRIMARY) HYPERTENSION: ICD-10-CM

## 2022-10-18 DIAGNOSIS — I25.10 ATHEROSCLEROTIC HEART DISEASE OF NATIVE CORONARY ARTERY W/OUT ANGINA PECTORIS: ICD-10-CM

## 2022-10-18 PROCEDURE — 93000 ELECTROCARDIOGRAM COMPLETE: CPT

## 2022-10-18 PROCEDURE — 99214 OFFICE O/P EST MOD 30 MIN: CPT

## 2022-10-18 RX ORDER — LANSOPRAZOLE 30 MG/1
30 CAPSULE, DELAYED RELEASE ORAL
Refills: 0 | Status: DISCONTINUED | COMMUNITY
End: 2022-10-18

## 2022-10-18 RX ORDER — MELOXICAM 15 MG/1
15 TABLET ORAL
Refills: 0 | Status: DISCONTINUED | COMMUNITY
End: 2022-10-18

## 2022-10-18 RX ORDER — LISDEXAMFETAMINE DIMESYLATE 20 MG/1
20 CAPSULE ORAL
Refills: 0 | Status: DISCONTINUED | COMMUNITY
End: 2022-10-18

## 2022-10-18 RX ORDER — EVOLOCUMAB 140 MG/ML
140 INJECTION, SOLUTION SUBCUTANEOUS AS DIRECTED
Qty: 2 | Refills: 0 | Status: COMPLETED | COMMUNITY

## 2022-10-18 RX ORDER — NITROGLYCERIN 0.4 MG/1
0.4 TABLET SUBLINGUAL
Qty: 90 | Refills: 3 | Status: ACTIVE | COMMUNITY
Start: 1900-01-01 | End: 1900-01-01

## 2022-10-18 RX ORDER — AMLODIPINE BESYLATE 5 MG/1
5 TABLET ORAL
Qty: 180 | Refills: 3 | Status: ACTIVE | COMMUNITY
Start: 1900-01-01 | End: 1900-01-01

## 2022-10-19 ENCOUNTER — TRANSCRIPTION ENCOUNTER (OUTPATIENT)
Age: 52
End: 2022-10-19

## 2022-10-19 ENCOUNTER — APPOINTMENT (OUTPATIENT)
Dept: ORTHOPEDIC SURGERY | Facility: CLINIC | Age: 52
End: 2022-10-19

## 2022-10-19 DIAGNOSIS — S83.231A COMPLEX TEAR OF MEDIAL MENISCUS, CURRENT INJURY, RIGHT KNEE, INITIAL ENCOUNTER: ICD-10-CM

## 2022-10-19 DIAGNOSIS — M54.16 RADICULOPATHY, LUMBAR REGION: ICD-10-CM

## 2022-10-19 DIAGNOSIS — S83.232A COMPLEX TEAR OF MEDIAL MENISCUS, CURRENT INJURY, LEFT KNEE, INITIAL ENCOUNTER: ICD-10-CM

## 2022-10-19 DIAGNOSIS — M77.11 LATERAL EPICONDYLITIS, RIGHT ELBOW: ICD-10-CM

## 2022-10-19 DIAGNOSIS — Z98.890 OTHER SPECIFIED POSTPROCEDURAL STATES: ICD-10-CM

## 2022-10-19 DIAGNOSIS — M54.12 RADICULOPATHY, CERVICAL REGION: ICD-10-CM

## 2022-10-19 DIAGNOSIS — M77.12 LATERAL EPICONDYLITIS, LEFT ELBOW: ICD-10-CM

## 2022-10-19 DIAGNOSIS — M25.531 PAIN IN RIGHT WRIST: ICD-10-CM

## 2022-10-19 PROCEDURE — 99213 OFFICE O/P EST LOW 20 MIN: CPT

## 2022-10-19 NOTE — PHYSICAL EXAM
[de-identified] :  no focal neurologic deficits pain in both elbows worse with resisted extension and supination at the wrist pain in the right hand still over the 4th digit  and in the left hand over the index finger spasm in the neck and back with limited motion negative straight leg bilaterally some pain in the right groin worse with rotation right knee has tenderness on the mediolateral side left knee has some crepitus moderate swelling has a brace on the left knee\par  right heel Achilles intact negative Garvin test diffuse tenderness on the calcaneus

## 2022-10-19 NOTE — HISTORY OF PRESENT ILLNESS
[FreeTextEntry1] : ELIZABETH OROSCO is a 52 year-old male, with a PMHx significant for HTN, HLD, and CAD w/ myocardial bridge, who presents today for a follow up visit.  Patient reports he has occasional substernal chest pain when he gets stressed. Notes he now lives in [place]. Otherwise: (-) radiation, (-) diaphoresis, (-) dyspnea, (-) pleuritic component, (-) ripping or tearing quality, (-) positional component, (-) exertional component, (-) dizziness, (-) syncope, (-) nausea, (-) vomiting, (-) calf swelling/pain, (-) neuro deficits.\par

## 2022-10-19 NOTE — ASSESSMENT
[FreeTextEntry1] :  will continue with his pain management follow-up   he remains totally disabled unable to work I will see him back in   6 months  no injections to his knees or elbows today

## 2022-10-19 NOTE — REASON FOR VISIT
[FreeTextEntry2] : follow up for left knee pain    and pain in both elbows right more than left   reports seeing a new doctor lately who did a cortisone injection in the left knee

## 2022-10-19 NOTE — DISCUSSION/SUMMARY
[EKG obtained to assist in diagnosis and management of assessed problem(s)] : EKG obtained to assist in diagnosis and management of assessed problem(s) [FreeTextEntry1] : EKG performed today was unremarkable.\par \par CAD: The impression is coronary artery disease. Currently, the condition is stable. There are no changes in medication management. Continue current medical therapy.\par \par HTN: The impression is hypertension. Currently, the condition is stable. There are no changes in medication management. Continue current medical therapy. Other planned treatments include an exercise regimen, weight loss, low sodium diet, and alcohol moderation.\par \par HLD: The impression is hyperlipidemia. Currently, the condition is stable. There are no changes in medication management. Continue current medical therapy. Other planned treatment includes diet modification, exercise, and weight loss.\par \par Instructed to follow up in 6 months.  \par \par Plan was discussed with the patient.\par

## 2023-01-03 ENCOUNTER — RX RENEWAL (OUTPATIENT)
Age: 53
End: 2023-01-03

## 2023-01-03 RX ORDER — EVOLOCUMAB 140 MG/ML
140 INJECTION, SOLUTION SUBCUTANEOUS
Qty: 2 | Refills: 2 | Status: ACTIVE | COMMUNITY
Start: 2023-01-03 | End: 1900-01-01

## 2023-03-08 ENCOUNTER — APPOINTMENT (OUTPATIENT)
Dept: ORTHOPEDIC SURGERY | Facility: CLINIC | Age: 53
End: 2023-03-08

## 2023-03-20 ENCOUNTER — APPOINTMENT (OUTPATIENT)
Dept: ORTHOPEDIC SURGERY | Facility: CLINIC | Age: 53
End: 2023-03-20

## 2023-08-21 ENCOUNTER — RX RENEWAL (OUTPATIENT)
Age: 53
End: 2023-08-21

## 2023-09-20 ENCOUNTER — RX RENEWAL (OUTPATIENT)
Age: 53
End: 2023-09-20

## 2023-09-20 RX ORDER — LOSARTAN POTASSIUM 25 MG/1
25 TABLET, FILM COATED ORAL DAILY
Qty: 90 | Refills: 0 | Status: ACTIVE | COMMUNITY
Start: 1900-01-01 | End: 1900-01-01

## 2024-07-31 NOTE — REVIEW OF SYSTEMS
12 [Joint Pain] : joint pain [Joint Stiffness] : joint stiffness [Joint Swelling] : joint swelling [Anxiety] : anxiety [Negative] : Heme/Lymph
